# Patient Record
Sex: MALE | Race: WHITE | Employment: FULL TIME | URBAN - NONMETROPOLITAN AREA
[De-identification: names, ages, dates, MRNs, and addresses within clinical notes are randomized per-mention and may not be internally consistent; named-entity substitution may affect disease eponyms.]

---

## 2018-08-16 ENCOUNTER — OFFICE VISIT (OUTPATIENT)
Dept: OTOLARYNGOLOGY | Facility: OTHER | Age: 43
End: 2018-08-16
Attending: PHYSICIAN ASSISTANT
Payer: OTHER MISCELLANEOUS

## 2018-08-16 ENCOUNTER — HOSPITAL ENCOUNTER (EMERGENCY)
Facility: HOSPITAL | Age: 43
Discharge: HOME | End: 2018-08-16
Attending: NURSE PRACTITIONER | Admitting: NURSE PRACTITIONER
Payer: OTHER MISCELLANEOUS

## 2018-08-16 VITALS
HEART RATE: 66 BPM | DIASTOLIC BLOOD PRESSURE: 71 MMHG | OXYGEN SATURATION: 99 % | RESPIRATION RATE: 19 BRPM | TEMPERATURE: 97.5 F | SYSTOLIC BLOOD PRESSURE: 122 MMHG

## 2018-08-16 VITALS
BODY MASS INDEX: 28.44 KG/M2 | OXYGEN SATURATION: 96 % | HEIGHT: 72 IN | SYSTOLIC BLOOD PRESSURE: 124 MMHG | WEIGHT: 210 LBS | DIASTOLIC BLOOD PRESSURE: 70 MMHG | HEART RATE: 84 BPM | TEMPERATURE: 98 F

## 2018-08-16 DIAGNOSIS — T16.1XXA EAR FOREIGN BODY, RIGHT, INITIAL ENCOUNTER: ICD-10-CM

## 2018-08-16 DIAGNOSIS — T16.1XXD: Primary | ICD-10-CM

## 2018-08-16 PROCEDURE — 99202 OFFICE O/P NEW SF 15 MIN: CPT | Performed by: NURSE PRACTITIONER

## 2018-08-16 PROCEDURE — G0463 HOSPITAL OUTPT CLINIC VISIT: HCPCS

## 2018-08-16 PROCEDURE — 69200 CLEAR OUTER EAR CANAL: CPT | Performed by: PHYSICIAN ASSISTANT

## 2018-08-16 PROCEDURE — 99202 OFFICE O/P NEW SF 15 MIN: CPT | Mod: 25 | Performed by: PHYSICIAN ASSISTANT

## 2018-08-16 RX ORDER — CIPROFLOXACIN AND DEXAMETHASONE 3; 1 MG/ML; MG/ML
4 SUSPENSION/ DROPS AURICULAR (OTIC) 2 TIMES DAILY
Qty: 7.5 ML | Refills: 0 | Status: SHIPPED | OUTPATIENT
Start: 2018-08-16 | End: 2018-08-23

## 2018-08-16 ASSESSMENT — ENCOUNTER SYMPTOMS
APPETITE CHANGE: 0
DYSURIA: 0
ACTIVITY CHANGE: 0
COUGH: 0
FEVER: 0
TROUBLE SWALLOWING: 0
WEAKNESS: 0
PSYCHIATRIC NEGATIVE: 1

## 2018-08-16 ASSESSMENT — PAIN SCALES - GENERAL: PAINLEVEL: MODERATE PAIN (4)

## 2018-08-16 NOTE — ED TRIAGE NOTES
Pt presents with pain to right ear since today when hot slag flew into his right ear. Rates the pain at 4-5/10 and describes it as a  continuous dull throbbing pain and states his hearing in that ear is becoming muffled.

## 2018-08-16 NOTE — PROGRESS NOTES
Occupational Visit     Subjective:  Boby Frost, 42 year old, male is seen Brittany Rousseau.  The date of injury is 8/16/18.  The patient is employed at [employer]  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 During that time, he had slag go into his right ear. This occurred at 0800 today. He went into  around 1100 for exam/ treatment.  provider did flush his ear, but only partial success. There is a piece of slag further on his TM.  He has otalgia about 4/10.   No bleeding.   Denies COM or otologic surgeries.   He has muffled sensation with his hearing at this time.     History reviewed. No pertinent past medical history.   No Known Allergies  No current outpatient prescriptions on file.     No current facility-administered medications for this visit.       ROS: 10 point ROS neg other than the symptoms noted above in the HPI.      Physical Exam  /70 (BP Location: Right arm, Patient Position: Chair, Cuff Size: Adult Regular)  Pulse 84  Temp 98  F (36.7  C) (Tympanic)  Ht 6' (1.829 m)  Wt 210 lb (95.3 kg)  SpO2 96%  BMI 28.48 kg/m2  General - The patient is well nourished and well developed, and appears to have good nutritional status.  Alert and oriented to person and place, answers questions and cooperates with examination appropriately.   Head and Face - Normocephalic and atraumatic, with no gross asymmetry noted.  The facial nerve is intact, with strong symmetric movements.  Voice and Breathing - The patient was breathing comfortably without the use of accessory muscles. There was no wheezing, stridor, or stertor.  The patients voice was clear and strong, and had appropriate pitch and quality.  Ears -The external auditory canals are patent, the tympanic membranes are intact without effusion, retraction or mass.  Bony landmarks are intact. There are scattered scant pieces of slag along RIGHT TM.   Ear examined under otologic microscopy.  Patient was placed in supine  position. 4 small pieces of slag were suctioned off of Right TM. TM appears intact without effusion.   Eyes - Extraocular movements intact, and the pupils were reactive to light.  Sclera were not icteric or injected, conjunctiva were pink and moist.  Mouth - Examination of the oral cavity showed pink, healthy oral mucosa. No lesions or ulcerations noted.  The tongue was mobile and midline, and the dentition were in good condition.    Throat - The walls of the oropharynx were smooth, pink, moist, symmetric, and had no lesions or ulcerations.  The tonsillar pillars and soft palate were symmetric.  The uvula was midline on elevation.    Neck - Normal midline excursion of the laryngotracheal complex during swallowing.  Full range of motion on passive movement.  Palpation of the occipital, submental, submandibular, internal jugular chain, and supraclavicular nodes did not demonstrate any abnormal lymph nodes or masses.  Palpation of the thyroid was soft and smooth, with no nodules or goiter appreciated.  The trachea was mobile and midline.  Nose - External contour is symmetric, no gross deflection or scars.  Nasal mucosa is pink and moist with no abnormal mucus.  The septum and turbinates were evaluated: Caudal septal deflect.     ASSESSMENT:    ICD-10-CM    1. FB ear, right, subsequent encounter T16.1XXD ciprofloxacin-dexamethasone (CIPRODEX) otic suspension     AUDIOLOGY ADULT REFERRAL     Ear overall looks well  Ciprodex BID for 7 days to right ear.   Audiogram recommended due to patient's ear fullness. However, no effusion seen on exam.     Ear protection recommended.   Return to ENT PRN.     He agrees with this plan      Brittany Rousseau PA-C  ENT  [facility], [place]  [phone]

## 2018-08-16 NOTE — ED AVS SNAPSHOT
[facility]    [address] [address]    Phone:  [phone]                                       Boby Frost   MRN: [id number]    Department:  [facility]   Date of Visit:  8/16/2018           Patient Information     Date Of Birth          1975        Your diagnoses for this visit were:     Ear foreign body, right, initial encounter        You were seen by Trisha Bright NP.      Follow-up Information     Follow up with [facility].    Specialty:  EMERGENCY MEDICINE    Why:  As needed, If symptoms worsen    Contact information:    [address] [place] [address] [phone]    Additional information:    From [place]: Take [address]. Turn left at [address]. Turn left at the first stoplight on [address]. At the first stop sign, take a right onto [address]. Take a left into the parking lot and continue through until you reach the North enterance of the building.       From [place]: Take [address]. Take the [address] ramp towards [place]. Turn left onto [address]. Take a slight right onto [address]. Turn left at the first stoplight on [address]. At the first stop sign, take a right onto [address]. Take a left into the parking lot and continue through until you reach the North enterance of the building.       From [place]: Take [address]. Take a right at [address]. At the first stop sign, take a right onto [address]. Take a left into the parking lot and continue through until you reach the North enterance of the building.         Discharge Instructions       To ENT to attempt to remove metal slag off TM.   Follow up with PCP with any increase in symptoms or concerns.   Return to urgent care or emergency department with any increase in symptoms or concerns.     Discharge References/Attachments     EAR, ANATOMY OF  "THE (ENGLISH)      Your next 10 appointments already scheduled     Aug 16, 2018  1:15 PM CDT   (Arrive by 1:00 PM)   New Visit with Brittany Rousseau PA-C   [facility] [place] ([facility] )    [place] [address]   [phone]                 Review of your medicines      Notice     You have not been prescribed any medications.            Orders Needing Specimen Collection     None      Pending Results     No orders found from 2018 to 2018.            Pending Culture Results     No orders found from 2018 to 2018.            Thank you for choosing [place]       Thank you for choosing [place] for your care. Our goal is always to provide you with excellent care. Hearing back from our patients is one way we can continue to improve our services. Please take a few minutes to complete the written survey that you may receive in the mail after you visit with us. Thank you!        [employer] Information     [employer] lets you send messages to your doctor, view your test results, renew your prescriptions, schedule appointments and more. To sign up, go to [email or web address] . Click on \"Log in\" on the left side of the screen, which will take you to the Welcome page. Then click on \"Sign up Now\" on the right side of the page.     You will be asked to enter the access code listed below, as well as some personal information. Please follow the directions to create your username and password.     Your access code is: [email or web address]  Expires: 2018 12:59 PM     Your access code will  in 90 days. If you need help or a new code, please call your [place] clinic or [phone].        Care EveryWhere ID     This is your Care EveryWhere ID. This could be used by other organizations to access your [place] medical records  [id number]        Equal Access to Services     CHRIS ANSARI AH: Rebekah Mcdonald, marcos carr, qaybta kabrady krueger, garret shipman " latoya chi ah. So [facility] [phone].    ATENCIÓN: Si habla español, tiene a bundy disposición servicios gratuitos de asistencia lingüística. Llame al [phone].    We comply with applicable federal civil rights laws and [place] laws. We do not discriminate on the basis of race, color, national origin, age, disability, sex, sexual orientation, or gender identity.            After Visit Summary       This is your record. Keep this with you and show to your community pharmacist(s) and doctor(s) at your next visit.

## 2018-08-16 NOTE — NURSING NOTE
Chief Complaint   Patient presents with     Work Comp     slag in ear-         Initial /70 (BP Location: Right arm, Patient Position: Chair, Cuff Size: Adult Regular)  Pulse 84  Temp 98  F (36.7  C) (Tympanic)  Ht 6' (1.829 m)  Wt 210 lb (95.3 kg)  SpO2 96%  BMI 28.48 kg/m2 Estimated body mass index is 28.48 kg/(m^2) as calculated from the following:    Height as of this encounter: 6' (1.829 m).    Weight as of this encounter: 210 lb (95.3 kg).  Medication Reconciliation: complete    Julia Louise LPN

## 2018-08-16 NOTE — DISCHARGE INSTRUCTIONS
To ENT to attempt to remove metal slag off TM.   Follow up with PCP with any increase in symptoms or concerns.   Return to urgent care or emergency department with any increase in symptoms or concerns.

## 2018-08-16 NOTE — MR AVS SNAPSHOT
After Visit Summary   8/16/2018    Boby Frost    MRN: [id number]           Patient Information     Date Of Birth          1975        Visit Information        Provider Department      8/16/2018 1:15 PM [facility]        Today's Diagnoses     FB ear, right, subsequent encounter    -  1      Care Instructions    Small particles removed.   Ear drum (tympanic membrane) appears intact without effusion  Start Ciprodex 4 drops twice a day for 7 days.   Complete audiogram in 2 -3 weeks    Thank you for allowing RIOS Ibarra and our ENT team to participate in your care.  If your medications are too expensive, please give the nurse a call.  We can possibly change this medication.  If you have a scheduling or an appointment question please contact [facility] Unit Coordinator at their direct line [phone].   ALL nursing questions or concerns can be directed to your ENT nurse at: [phone] [place]               Follow-ups after your visit        Who to contact     If you have questions or need follow up information about today's clinic visit or your schedule please contact [facility] directly at [phone].  Normal or non-critical lab and imaging results will be communicated to you by MyChart, letter or phone within 4 business days after the clinic has received the results. If you do not hear from us within 7 days, please contact the clinic through [employer] or phone. If you have a critical or abnormal lab result, we will notify you by phone as soon as possible.  Submit refill requests through [employer] or call your pharmacy and they will forward the refill request to us. Please allow 3 business days for your refill to be completed.          Additional Information About Your Visit        [employer] Information     [employer] lets you send messages to your doctor, view your test results, renew your prescriptions, schedule appointments and more. To  "sign up, go to [email or web address] . Click on \"Log in\" on the left side of the screen, which will take you to the Welcome page. Then click on \"Sign up Now\" on the right side of the page.     You will be asked to enter the access code listed below, as well as some personal information. Please follow the directions to create your username and password.     Your access code is: [email or web address]  Expires: 2018 12:59 PM     Your access code will  in 90 days. If you need help or a new code, please call your [place] clinic or [phone].        Care EveryWhere ID     This is your Care EveryWhere ID. This could be used by other organizations to access your [place] medical records  [id number]        Your Vitals Were     Pulse Temperature Height Pulse Oximetry BMI (Body Mass Index)       84 98  F (36.7  C) (Tympanic) 6' (1.829 m) 96% 28.48 kg/m2        Blood Pressure from Last 3 Encounters:   18 124/70   18 122/71    Weight from Last 3 Encounters:   18 210 lb (95.3 kg)              Today, you had the following     No orders found for display         Today's Medication Changes          These changes are accurate as of 18  1:34 PM.  If you have any questions, ask your nurse or doctor.               Start taking these medicines.        Dose/Directions    ciprofloxacin-dexamethasone otic suspension   Commonly known as:  CIPRODEX   Used for:  FB ear, right, subsequent encounter   Started by:  Brittany Rousseau PA-C        Dose:  4 drop   Place 4 drops into the right ear 2 times daily for 7 days   Quantity:  7.5 mL   Refills:  0            Where to get your medicines      These medications were sent to [facility] [place]  [address] AT [facility] OF  &   [address] [address]     Phone:  [phone]     ciprofloxacin-dexamethasone otic suspension                Primary Care Provider Fax #    Physician No Ref-Primary [phone]       " No address on file        Equal Access to Services     [facility] COTY : Hadii aad ku hadpitamichel Deborahali, waqueda luqmarciha, qamaureen kaloveamor krueger, garret soto. So [facility] [phone].    ATENCIÓN: Si habla español, tiene a bundy disposición servicios gratuitos de asistencia lingüística. Llame al [phone].    We comply with applicable federal civil rights laws and [place] laws. We do not discriminate on the basis of race, color, national origin, age, disability, sex, sexual orientation, or gender identity.            Thank you!     Thank you for choosing [facility]  for your care. Our goal is always to provide you with excellent care. Hearing back from our patients is one way we can continue to improve our services. Please take a few minutes to complete the written survey that you may receive in the mail after your visit with us. Thank you!             Your Updated Medication List - Protect others around you: Learn how to safely use, store and throw away your medicines at www.disposemymeds.org.          This list is accurate as of 8/16/18  1:34 PM.  Always use your most recent med list.                   Brand Name Dispense Instructions for use Diagnosis    ciprofloxacin-dexamethasone otic suspension    CIPRODEX    7.5 mL    Place 4 drops into the right ear 2 times daily for 7 days    FB ear, right, subsequent encounter

## 2018-08-16 NOTE — ED PROVIDER NOTES
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      The history is provided by the patient. No  was used.     Boby Frost is a 42 year old male who presents with right ear pain. 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 He works for [employer] He had taken his hearing protection out for a brief period when slag went into his ear. He has muffled hearing. No interventions for symptoms. No antibiotic use in the past 30 days.     Problem List:    There are no active problems to display for this patient.       Past Medical History:    History reviewed. No pertinent past medical history.    Past Surgical History:    No past surgical history on file.    Family History:    No family history on file.    Social History:  Marital Status:   [2]  Social History   Substance Use Topics     Smoking status: Not on file     Smokeless tobacco: Not on file     Alcohol use Not on file        Medications:      No current outpatient prescriptions on file.      Review of Systems   Constitutional: Negative for activity change, appetite change and fever.   HENT: Positive for ear pain and hearing loss. Negative for ear discharge and trouble swallowing.         Muffled hearing in right ear.    Respiratory: Negative for cough.    Genitourinary: Negative for dysuria.   Skin: Negative for rash.   Neurological: Negative for weakness.   Psychiatric/Behavioral: Negative.        Physical Exam   BP: 122/71  Pulse: 66  Temp: 97.5  F (36.4  C)  Resp: 19  SpO2: 99 %      Physical Exam   Constitutional: He is oriented to person, place, and time. He appears well-developed and well-nourished. No distress.   HENT:   Head: Normocephalic.   Left Ear: External ear normal.   Mouth/Throat: Oropharynx is clear and moist. No oropharyngeal exudate.   Right TM with a metal foreign body present. Right TM intact. Metal " fragment in inner ear canal.    Neck: Normal range of motion. Neck supple.   Cardiovascular: Normal rate, regular rhythm and normal heart sounds.    No murmur heard.  Pulmonary/Chest: Effort normal. No respiratory distress. He has no wheezes. He has no rales.   Abdominal: Soft. He exhibits no distension.   Musculoskeletal: Normal range of motion.   Lymphadenopathy:     He has no cervical adenopathy.   Neurological: He is alert and oriented to person, place, and time. He exhibits normal muscle tone.   Skin: Skin is warm and dry. No rash noted. He is not diaphoretic.   Psychiatric: He has a normal mood and affect. His behavior is normal.   Nursing note and vitals reviewed.      ED Course     ED Course     Procedures    Right ear irrigated with warm water. Scant metal particles return. Metal fragment remains present to TM. Right ear is sensitive from ear flush and ear feels full. Ear flush stopped.     Assessments & Plan (with Medical Decision Making)     Consulted with RIOS Thomas working with ENT on foreign body on TM. She will work him into her schedule today. He will go to ENT now and she will see him when she can. He is happy with this plan.     He will need to see audiology for muffled hearing. He verbalized understanding.     Discussed plan of care. He verbalized understanding. All questions answered.     I have reviewed the nursing notes.    I have reviewed the findings, diagnosis, plan and need for follow up with the patient.  Discharged in stable condition.     New Prescriptions    No medications on file       Final diagnoses:   Ear foreign body, right, initial encounter     To ENT to attempt to remove metal slag off TM.   Follow up with PCP with any increase in symptoms or concerns.   Return to urgent care or emergency department with any increase in symptoms or concerns.     MITUL Newsome  8/16/2018  12:06 PM  URGENT CARE CLINIC       Trisha Bright NP  08/22/18 1034

## 2018-08-16 NOTE — LETTER
8/16/2018         RE: Boby Frost  [address]  [facility] [address]        Dear Colleague,    Thank you for referring your patient, Boby Frost, to the [facility]. Please see a copy of my visit note below.    Occupational Visit     Subjective:  oBby Frost, 42 year old, male is seen Brittany Rousseau.  The date of injury is 8/16/18.  The patient is employed at [employer]  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 During that time, he had slag go into his right ear. This occurred at 0800 today. He went into  around 1100 for exam/ treatment.  provider did flush his ear, but only partial success. There is a piece of slag further on his TM.  He has otalgia about 4/10.   No bleeding.   Denies COM or otologic surgeries.   He has muffled sensation with his hearing at this time.     History reviewed. No pertinent past medical history.   No Known Allergies  No current outpatient prescriptions on file.     No current facility-administered medications for this visit.       ROS: 10 point ROS neg other than the symptoms noted above in the HPI.      Physical Exam  /70 (BP Location: Right arm, Patient Position: Chair, Cuff Size: Adult Regular)  Pulse 84  Temp 98  F (36.7  C) (Tympanic)  Ht 6' (1.829 m)  Wt 210 lb (95.3 kg)  SpO2 96%  BMI 28.48 kg/m2  General - The patient is well nourished and well developed, and appears to have good nutritional status.  Alert and oriented to person and place, answers questions and cooperates with examination appropriately.   Head and Face - Normocephalic and atraumatic, with no gross asymmetry noted.  The facial nerve is intact, with strong symmetric movements.  Voice and Breathing - The patient was breathing comfortably without the use of accessory muscles. There was no wheezing, stridor, or stertor.  The patients voice was clear and strong, and had appropriate pitch and quality.  Ears -The external  auditory canals are patent, the tympanic membranes are intact without effusion, retraction or mass.  Bony landmarks are intact. There are scattered scant pieces of slag along RIGHT TM.   Ear examined under otologic microscopy.  Patient was placed in supine position. 4 small pieces of slag were suctioned off of Right TM. TM appears intact without effusion.   Eyes - Extraocular movements intact, and the pupils were reactive to light.  Sclera were not icteric or injected, conjunctiva were pink and moist.  Mouth - Examination of the oral cavity showed pink, healthy oral mucosa. No lesions or ulcerations noted.  The tongue was mobile and midline, and the dentition were in good condition.    Throat - The walls of the oropharynx were smooth, pink, moist, symmetric, and had no lesions or ulcerations.  The tonsillar pillars and soft palate were symmetric.  The uvula was midline on elevation.    Neck - Normal midline excursion of the laryngotracheal complex during swallowing.  Full range of motion on passive movement.  Palpation of the occipital, submental, submandibular, internal jugular chain, and supraclavicular nodes did not demonstrate any abnormal lymph nodes or masses.  Palpation of the thyroid was soft and smooth, with no nodules or goiter appreciated.  The trachea was mobile and midline.  Nose - External contour is symmetric, no gross deflection or scars.  Nasal mucosa is pink and moist with no abnormal mucus.  The septum and turbinates were evaluated: Caudal septal deflect.     ASSESSMENT:    ICD-10-CM    1. FB ear, right, subsequent encounter T16.1XXD ciprofloxacin-dexamethasone (CIPRODEX) otic suspension     AUDIOLOGY ADULT REFERRAL     Ear overall looks well  Ciprodex BID for 7 days to right ear.   Audiogram recommended due to patient's ear fullness. However, no effusion seen on exam.     Ear protection recommended.   Return to ENT PRN.     He agrees with this plan      Brittany Rousseau PA-C  ENT  [facility]  [place]  [phone]      Again, thank you for allowing me to participate in the care of your patient.        Sincerely,        Brittany Rousseau PA-C

## 2018-08-16 NOTE — ED AVS SNAPSHOT
[facility]    [address] [address]    Phone:  [phone]                                       Boby Frost   MRN: [id number]    Department:  [facility]   Date of Visit:  8/16/2018           After Visit Summary Signature Page     I have received my discharge instructions, and my questions have been answered. I have discussed any challenges I see with this plan with the nurse or doctor.    ..........................................................................................................................................  Patient/Patient Representative Signature      ..........................................................................................................................................  Patient Representative Print Name and Relationship to Patient    ..................................................               ................................................  Date                                            Time    ..........................................................................................................................................  Reviewed by Signature/Title    ...................................................              ..............................................  Date                                                            Time

## 2018-08-16 NOTE — PATIENT INSTRUCTIONS
Small particles removed.   Ear drum (tympanic membrane) appears intact without effusion  Start Ciprodex 4 drops twice a day for 7 days.   Complete audiogram in 2 -3 weeks    Thank you for allowing [facility] and our ENT team to participate in your care.  If your medications are too expensive, please give the nurse a call.  We can possibly change this medication.  If you have a scheduling or an appointment question please contact [facility] Unit Coordinator at their direct line [phone].   ALL nursing questions or concerns can be directed to your ENT nurse at: [phone] [place]

## 2018-09-04 ENCOUNTER — OFFICE VISIT (OUTPATIENT)
Dept: AUDIOLOGY | Facility: OTHER | Age: 43
End: 2018-09-04
Attending: AUDIOLOGIST
Payer: OTHER MISCELLANEOUS

## 2018-09-04 DIAGNOSIS — H90.3 SENSORINEURAL HEARING LOSS, BILATERAL: ICD-10-CM

## 2018-09-04 DIAGNOSIS — H69.93 DYSFUNCTION OF BOTH EUSTACHIAN TUBES: ICD-10-CM

## 2018-09-04 PROCEDURE — 92557 COMPREHENSIVE HEARING TEST: CPT | Performed by: AUDIOLOGIST

## 2018-09-04 PROCEDURE — 92567 TYMPANOMETRY: CPT | Performed by: AUDIOLOGIST

## 2018-09-04 NOTE — MR AVS SNAPSHOT
"              After Visit Summary   9/4/2018    Boby Frost    MRN: [id number]           Patient Information     Date Of Birth          1975        Visit Information        Provider Department      9/4/2018 3:45 PM [facility]        Today's Diagnoses     Sensorineural hearing loss, bilateral        Dysfunction of both eustachian tubes           Follow-ups after your visit        Your next 10 appointments already scheduled     Sep 13, 2018  9:00 AM CDT   (Arrive by 8:45 AM)   New Visit with Brittany Rousseau PA-C   [facility] [place] ([facility] [place] )    [address]  [place] [address]   [phone]              Who to contact     If you have questions or need follow up information about today's clinic visit or your schedule please contact [facility] directly at [phone].  Normal or non-critical lab and imaging results will be communicated to you by MyChart, letter or phone within 4 business days after the clinic has received the results. If you do not hear from us within 7 days, please contact the clinic through MyChart or phone. If you have a critical or abnormal lab result, we will notify you by phone as soon as possible.  Submit refill requests through Vi[employer] or call your pharmacy and they will forward the refill request to us. Please allow 3 business days for your refill to be completed.          Additional Information About Your Visit        MyChart Information     ViS lets you send messages to your doctor, view your test results, renew your prescriptions, schedule appointments and more. To sign up, go to [email or web address] . Click on \"Log in\" on the left side of the screen, which will take you to the Welcome page. Then click on \"Sign up Now\" on the right side of the page.     You will be asked to enter the access code listed below, as well as some personal information. Please follow the directions to create " your username and password.     Your access code is: [email or web address]  Expires: 2018 12:59 PM     Your access code will  in 90 days. If you need help or a new code, please call your [facility] clinic or [phone].        Care EveryWhere ID     This is your Care EveryWhere ID. This could be used by other organizations to access your [facility] medical records  [id number]         Blood Pressure from Last 3 Encounters:   18 124/70   18 122/71    Weight from Last 3 Encounters:   18 210 lb (95.3 kg)              Today, you had the following     No orders found for display       Primary Care Provider Fax #    Physician No Ref-Primary [phone]       No address on file        Equal Access to Services     CHRIS ANSARI : Rebekah Mcdonald, waaxda luqadaha, qaybta kaalmada adetoshayaamor, garret chi . So [facility] [phone].    ATENCIÓN: Si habla español, tiene a bundy disposición servicios gratuitos de asistencia lingüística. Llame al [phone].    We comply with applicable federal civil rights laws and [place] laws. We do not discriminate on the basis of race, color, national origin, age, disability, sex, sexual orientation, or gender identity.            Thank you!     [facility] you for [facility]  [facility] your [facility]. Our goal is always to provide you with excellent care. Hearing back from our patients is one way we can continue to improve our services. Please take a few minutes to complete the written survey that you may receive in the mail after your visit with us. Thank you!             Your Updated Medication List - Protect others around you: Learn how to safely use, store and throw away your medicines at www.disposemymeds.org.      Notice  As of 2018  3:52 PM    You have not been prescribed any medications.

## 2022-10-10 ENCOUNTER — MEDICAL CORRESPONDENCE (OUTPATIENT)
Dept: HEALTH INFORMATION MANAGEMENT | Facility: HOSPITAL | Age: 47
End: 2022-10-10

## 2024-01-08 NOTE — PROGRESS NOTES
Audiology Evaluation Completed. Please refer SCANNED AUDIOGRAM and/or TYMPANOGRAM for BACKGROUND, RESULTS, RECOMMENDATIONS.    UNDER RECOMMENDATIONS ON AUDIOGRAM PATIENT REFERRED TO ENT WITH SYMPTOMS      Mirella BERRIOS, [facility]  Audiologist #[id number]        NO EPIC REFERRAL/ORDER NEEDED TO ENT BY AUDIOLOGY AS PATIENT ALREADY HAS AN APPOINTMENT WITH ENT             Please print prescan out of Media Tab and then complete and sign.   Once form is completed and signed, please fax to [phone].    Please direct any questions to [phone].   Thanks,  Forms Completion Team.